# Patient Record
Sex: FEMALE | Race: WHITE | NOT HISPANIC OR LATINO | Employment: FULL TIME | URBAN - METROPOLITAN AREA
[De-identification: names, ages, dates, MRNs, and addresses within clinical notes are randomized per-mention and may not be internally consistent; named-entity substitution may affect disease eponyms.]

---

## 2018-07-27 ENCOUNTER — OFFICE VISIT (OUTPATIENT)
Dept: INTERNAL MEDICINE | Facility: CLINIC | Age: 53
End: 2018-07-27
Payer: COMMERCIAL

## 2018-07-27 VITALS
DIASTOLIC BLOOD PRESSURE: 68 MMHG | HEIGHT: 64 IN | BODY MASS INDEX: 24.28 KG/M2 | HEART RATE: 77 BPM | WEIGHT: 142.19 LBS | SYSTOLIC BLOOD PRESSURE: 106 MMHG | OXYGEN SATURATION: 98 % | TEMPERATURE: 98 F

## 2018-07-27 DIAGNOSIS — H92.03 EAR DISCOMFORT, BILATERAL: Primary | ICD-10-CM

## 2018-07-27 PROCEDURE — 3008F BODY MASS INDEX DOCD: CPT | Mod: CPTII,S$GLB,, | Performed by: PHYSICIAN ASSISTANT

## 2018-07-27 PROCEDURE — 99213 OFFICE O/P EST LOW 20 MIN: CPT | Mod: S$GLB,,, | Performed by: PHYSICIAN ASSISTANT

## 2018-07-27 PROCEDURE — 99999 PR PBB SHADOW E&M-EST. PATIENT-LVL III: CPT | Mod: PBBFAC,,, | Performed by: PHYSICIAN ASSISTANT

## 2018-07-27 NOTE — PROGRESS NOTES
"Subjective:       Patient ID: Cheyenne Diallo is a 53 y.o. female.    Chief Complaint: Ear Fullness    53 year old female c/o intermittent B ear discomfort X "long time." Pt is new to me. She reports occasionally using OTC earwax eardrops with questionable improvement of sxs. She reports having earwax removed from ears in the past and feels like sxs may be due to that. She reports ears are not bothering her today, but she would like them checked because she is flying to the beach today. She reports no ear pain, sore throat, fever, chills, N/V, ear drainage/bleeding, ear injury, or other medical complaints. Pt is due for screening colonoscopy - refuses at this time. She reports having pap and mammogram at [facility] Dr. [facility] office approx. March 2018.    PCP: Dr. Uribe (has not seen since 2015)        Review of Systems   Constitutional: Negative for chills and fever.   HENT: Negative for ear discharge, ear pain, rhinorrhea and sore throat.    Respiratory: Negative for cough and shortness of breath.    Cardiovascular: Negative for chest pain and leg swelling.   Gastrointestinal: Negative for nausea and vomiting.   Skin: Negative for rash.   Neurological: Negative for dizziness, weakness, numbness and headaches.   Psychiatric/Behavioral: Negative for confusion.       Objective:       Vitals:    07/27/18 0829   BP: 106/68   BP Location: Right arm   Patient Position: Sitting   BP Method: Small (Manual)   Pulse: 77   Temp: 97.8 °F (36.6 °C)   TempSrc: Tympanic   SpO2: 98%   Weight: 64.5 kg (142 lb 3.2 oz)   Height: 5' 4" (1.626 m)     Physical Exam   Constitutional: She is oriented to person, place, and time. She appears well-developed and well-nourished. No distress.   HENT:   Head: Normocephalic and atraumatic.   Right Ear: Tympanic membrane and ear canal normal.   Left Ear: Tympanic membrane and ear canal normal.   Mouth/Throat: Oropharynx is clear and moist. No oropharyngeal exudate.   Minimal amount of wax to B " ear canals   Eyes: EOM are normal. No scleral icterus.   Neck: Neck supple.   Cardiovascular: Normal rate and regular rhythm.    Pulmonary/Chest: Effort normal and breath sounds normal. No respiratory distress. She has no decreased breath sounds. She has no wheezes. She has no rhonchi. She has no rales.   Musculoskeletal: Normal range of motion. She exhibits no edema.   Neurological: She is alert and oriented to person, place, and time. No cranial nerve deficit.   Skin: Skin is warm and dry. No rash noted.   Psychiatric: She has a normal mood and affect. Her speech is normal and behavior is normal. Thought content normal.       Assessment:       1. Ear discomfort, bilateral        Plan:         Cheyenne Ron was seen today for ear fullness.    Diagnoses and all orders for this visit:    Ear discomfort, bilateral  Small amount of wax noted in ears today. Pt denies any current ear discomfort. Pt declines ENT evaluation today. Recommend OTC earwax softening eardrops and avoiding using Q-tips in ear canals. Monitor sxs. RTC or f/u with ENT if sxs persist or worsen.    Pt to f/u with PCP for health management - she reports she will make her own appt and declines to let us schedule appt for her at this time.

## 2018-11-20 ENCOUNTER — IMMUNIZATION (OUTPATIENT)
Dept: PHARMACY | Facility: CLINIC | Age: 53
End: 2018-11-20
Payer: COMMERCIAL